# Patient Record
Sex: FEMALE | Race: WHITE | HISPANIC OR LATINO | Employment: UNEMPLOYED | URBAN - METROPOLITAN AREA
[De-identification: names, ages, dates, MRNs, and addresses within clinical notes are randomized per-mention and may not be internally consistent; named-entity substitution may affect disease eponyms.]

---

## 2022-07-19 ENCOUNTER — APPOINTMENT (EMERGENCY)
Dept: CT IMAGING | Facility: HOSPITAL | Age: 42
End: 2022-07-19

## 2022-07-19 ENCOUNTER — HOSPITAL ENCOUNTER (EMERGENCY)
Facility: HOSPITAL | Age: 42
Discharge: HOME | End: 2022-07-19
Attending: EMERGENCY MEDICINE

## 2022-07-19 VITALS
RESPIRATION RATE: 16 BRPM | OXYGEN SATURATION: 100 % | TEMPERATURE: 98.9 F | SYSTOLIC BLOOD PRESSURE: 119 MMHG | DIASTOLIC BLOOD PRESSURE: 81 MMHG | HEART RATE: 76 BPM

## 2022-07-19 DIAGNOSIS — R10.84 GENERALIZED ABDOMINAL PAIN: Primary | ICD-10-CM

## 2022-07-19 DIAGNOSIS — N39.0 UTI (URINARY TRACT INFECTION): ICD-10-CM

## 2022-07-19 LAB
ALBUMIN SERPL BCP-MCNC: 4.2 G/DL (ref 3.5–5)
ALP SERPL-CCNC: 82 U/L (ref 34–104)
ALT SERPL W P-5'-P-CCNC: 35 U/L (ref 7–52)
ANION GAP SERPL CALCULATED.3IONS-SCNC: 4 MMOL/L (ref 4–13)
AST SERPL W P-5'-P-CCNC: 23 U/L (ref 13–39)
B-HCG SERPL-ACNC: <1 MIU/ML (ref 0–11.6)
BACTERIA UR QL AUTO: ABNORMAL /HPF
BASOPHILS # BLD AUTO: 0.03 THOUSANDS/ΜL (ref 0–0.1)
BASOPHILS NFR BLD AUTO: 0 % (ref 0–1)
BILIRUB SERPL-MCNC: 0.93 MG/DL (ref 0.2–1)
BILIRUB UR QL STRIP: NEGATIVE
BUN SERPL-MCNC: 14 MG/DL (ref 5–25)
CALCIUM SERPL-MCNC: 9.4 MG/DL (ref 8.4–10.2)
CHLORIDE SERPL-SCNC: 106 MMOL/L (ref 96–108)
CLARITY UR: CLEAR
CO2 SERPL-SCNC: 29 MMOL/L (ref 21–32)
COLOR UR: ABNORMAL
CREAT SERPL-MCNC: 0.75 MG/DL (ref 0.6–1.3)
EOSINOPHIL # BLD AUTO: 0.48 THOUSAND/ΜL (ref 0–0.61)
EOSINOPHIL NFR BLD AUTO: 5 % (ref 0–6)
ERYTHROCYTE [DISTWIDTH] IN BLOOD BY AUTOMATED COUNT: 13.1 % (ref 11.6–15.1)
GFR SERPL CREATININE-BSD FRML MDRD: 98 ML/MIN/1.73SQ M
GLUCOSE SERPL-MCNC: 93 MG/DL (ref 65–140)
GLUCOSE UR STRIP-MCNC: NEGATIVE MG/DL
HCT VFR BLD AUTO: 44.7 % (ref 34.8–46.1)
HGB BLD-MCNC: 14.9 G/DL (ref 11.5–15.4)
HGB UR QL STRIP.AUTO: ABNORMAL
IMM GRANULOCYTES # BLD AUTO: 0.04 THOUSAND/UL (ref 0–0.2)
IMM GRANULOCYTES NFR BLD AUTO: 0 % (ref 0–2)
KETONES UR STRIP-MCNC: NEGATIVE MG/DL
LEUKOCYTE ESTERASE UR QL STRIP: ABNORMAL
LIPASE SERPL-CCNC: 36 U/L (ref 11–82)
LYMPHOCYTES # BLD AUTO: 3.27 THOUSANDS/ΜL (ref 0.6–4.47)
LYMPHOCYTES NFR BLD AUTO: 31 % (ref 14–44)
MCH RBC QN AUTO: 29.2 PG (ref 26.8–34.3)
MCHC RBC AUTO-ENTMCNC: 33.3 G/DL (ref 31.4–37.4)
MCV RBC AUTO: 88 FL (ref 82–98)
MONOCYTES # BLD AUTO: 0.69 THOUSAND/ΜL (ref 0.17–1.22)
MONOCYTES NFR BLD AUTO: 7 % (ref 4–12)
NEUTROPHILS # BLD AUTO: 6.08 THOUSANDS/ΜL (ref 1.85–7.62)
NEUTS SEG NFR BLD AUTO: 57 % (ref 43–75)
NITRITE UR QL STRIP: NEGATIVE
NON-SQ EPI CELLS URNS QL MICRO: ABNORMAL /HPF
NRBC BLD AUTO-RTO: 0 /100 WBCS
PH UR STRIP.AUTO: 6 [PH]
PLATELET # BLD AUTO: 261 THOUSANDS/UL (ref 149–390)
PMV BLD AUTO: 9.5 FL (ref 8.9–12.7)
POTASSIUM SERPL-SCNC: 3.6 MMOL/L (ref 3.5–5.3)
PROT SERPL-MCNC: 7.9 G/DL (ref 6.4–8.4)
PROT UR STRIP-MCNC: NEGATIVE MG/DL
RBC # BLD AUTO: 5.11 MILLION/UL (ref 3.81–5.12)
RBC #/AREA URNS AUTO: ABNORMAL /HPF
SODIUM SERPL-SCNC: 139 MMOL/L (ref 135–147)
SP GR UR STRIP.AUTO: 1.01 (ref 1–1.03)
UROBILINOGEN UR STRIP-ACNC: <2 MG/DL
WBC # BLD AUTO: 10.59 THOUSAND/UL (ref 4.31–10.16)
WBC #/AREA URNS AUTO: ABNORMAL /HPF

## 2022-07-19 PROCEDURE — 80053 COMPREHEN METABOLIC PANEL: CPT | Performed by: EMERGENCY MEDICINE

## 2022-07-19 PROCEDURE — 83690 ASSAY OF LIPASE: CPT | Performed by: EMERGENCY MEDICINE

## 2022-07-19 PROCEDURE — 84702 CHORIONIC GONADOTROPIN TEST: CPT | Performed by: EMERGENCY MEDICINE

## 2022-07-19 PROCEDURE — G1004 CDSM NDSC: HCPCS

## 2022-07-19 PROCEDURE — 99284 EMERGENCY DEPT VISIT MOD MDM: CPT

## 2022-07-19 PROCEDURE — 99284 EMERGENCY DEPT VISIT MOD MDM: CPT | Performed by: EMERGENCY MEDICINE

## 2022-07-19 PROCEDURE — 81001 URINALYSIS AUTO W/SCOPE: CPT | Performed by: EMERGENCY MEDICINE

## 2022-07-19 PROCEDURE — 74176 CT ABD & PELVIS W/O CONTRAST: CPT

## 2022-07-19 PROCEDURE — 85025 COMPLETE CBC W/AUTO DIFF WBC: CPT | Performed by: EMERGENCY MEDICINE

## 2022-07-19 PROCEDURE — 96374 THER/PROPH/DIAG INJ IV PUSH: CPT

## 2022-07-19 PROCEDURE — 76856 US EXAM PELVIC COMPLETE: CPT | Performed by: EMERGENCY MEDICINE

## 2022-07-19 PROCEDURE — 36415 COLL VENOUS BLD VENIPUNCTURE: CPT

## 2022-07-19 RX ORDER — CEFUROXIME AXETIL 500 MG/1
500 TABLET ORAL EVERY 12 HOURS SCHEDULED
Qty: 14 TABLET | Refills: 0 | Status: SHIPPED | OUTPATIENT
Start: 2022-07-19 | End: 2022-07-26

## 2022-07-19 RX ORDER — KETOROLAC TROMETHAMINE 30 MG/ML
15 INJECTION, SOLUTION INTRAMUSCULAR; INTRAVENOUS ONCE
Status: COMPLETED | OUTPATIENT
Start: 2022-07-19 | End: 2022-07-19

## 2022-07-19 RX ADMIN — KETOROLAC TROMETHAMINE 15 MG: 30 INJECTION, SOLUTION INTRAMUSCULAR at 17:56

## 2022-07-19 NOTE — ED PROVIDER NOTES
History  Chief Complaint   Patient presents with    Abdominal Pain     Pt to ED with c/o abdominal pain, n/v, pt reports last menstrual period 5/27/2022     This is a 14-year-old female without any significant related past medical history presenting to the ED today for lower abdominal pain  Patient states that her pain is in her bilateral lower quadrants, and is 4 to 5/10 intensity, worsened by any type movement, without any relation to food or urination or bowel movements  She states she has nausea occasionally, but currently none  She denies any other modifying factors for her pain  Patient denies any other associated symptoms  Her last menstrual period was in May, she is sexually active only with 1 man over the past 6 months  She has not had any vaginal bleeding, discharge dysuria frequency hesitancy or any other significant related symptoms  Patient is Panamanian only speaking, professional interpretation services utilized to complete history and exam       Abdominal Pain  Associated symptoms: no chest pain, no chills, no cough, no dysuria, no fever, no nausea, no shortness of breath and no vomiting        None       No past medical history on file  No past surgical history on file  No family history on file  I have reviewed and agree with the history as documented  E-Cigarette/Vaping     E-Cigarette/Vaping Substances          Review of Systems   Constitutional: Negative for activity change, chills and fever  HENT: Negative for congestion and rhinorrhea  Eyes: Negative for photophobia and visual disturbance  Respiratory: Negative for cough, chest tightness and shortness of breath  Cardiovascular: Negative for chest pain and leg swelling  Gastrointestinal: Positive for abdominal pain  Negative for abdominal distention, nausea and vomiting  Genitourinary: Negative for dysuria and frequency  Musculoskeletal: Negative for back pain and neck stiffness     Skin: Negative for rash and wound    Neurological: Negative for dizziness and weakness  Psychiatric/Behavioral: Negative for agitation and suicidal ideas  Physical Exam  Physical Exam  Vitals and nursing note reviewed  Constitutional:       General: She is not in acute distress  Appearance: Normal appearance  She is obese  She is not ill-appearing or toxic-appearing  HENT:      Head: Normocephalic and atraumatic  Right Ear: External ear normal       Left Ear: External ear normal       Nose: Nose normal  No congestion or rhinorrhea  Mouth/Throat:      Mouth: Mucous membranes are moist       Pharynx: Oropharynx is clear  No oropharyngeal exudate  Eyes:      General: No scleral icterus  Conjunctiva/sclera: Conjunctivae normal    Cardiovascular:      Rate and Rhythm: Normal rate and regular rhythm  Pulses: Normal pulses  Heart sounds: Normal heart sounds  No murmur heard  No gallop  Pulmonary:      Effort: Pulmonary effort is normal  No respiratory distress  Breath sounds: Normal breath sounds  No stridor  No wheezing or rhonchi  Abdominal:      General: Abdomen is flat  Bowel sounds are normal  There is no distension  Palpations: Abdomen is soft  There is no mass  Tenderness: There is abdominal tenderness in the right lower quadrant and left lower quadrant  There is no right CVA tenderness, left CVA tenderness, guarding or rebound  Musculoskeletal:         General: No swelling or tenderness  Normal range of motion  Cervical back: Normal range of motion and neck supple  No tenderness  Right lower leg: No edema  Left lower leg: No edema  Skin:     General: Skin is warm and dry  Capillary Refill: Capillary refill takes less than 2 seconds  Coloration: Skin is not jaundiced  Findings: No bruising  Neurological:      General: No focal deficit present  Mental Status: She is alert and oriented to person, place, and time  Mental status is at baseline  Cranial Nerves: No cranial nerve deficit  Sensory: No sensory deficit  Motor: No weakness  Psychiatric:         Mood and Affect: Mood normal  Mood is not anxious or depressed  Behavior: Behavior normal          Thought Content: Thought content normal          Judgment: Judgment normal          Vital Signs  ED Triage Vitals [07/19/22 1530]   Temperature Pulse Respirations Blood Pressure SpO2   98 9 °F (37 2 °C) 81 18 127/68 100 %      Temp Source Heart Rate Source Patient Position - Orthostatic VS BP Location FiO2 (%)   Oral Monitor Sitting Left arm --      Pain Score       --           Vitals:    07/19/22 1530   BP: 127/68   Pulse: 81   Patient Position - Orthostatic VS: Sitting         Visual Acuity      ED Medications  Medications - No data to display    Diagnostic Studies  Results Reviewed     Procedure Component Value Units Date/Time    CBC and differential [[id number]]  (Abnormal) Collected: 07/19/22 1537    Lab Status: Final result Specimen: Blood from Arm, Right Updated: 07/19/22 1551     WBC 10 59 Thousand/uL      RBC 5 11 Million/uL      Hemoglobin 14 9 g/dL      Hematocrit 44 7 %      MCV 88 fL      MCH 29 2 pg      MCHC 33 3 g/dL      RDW 13 1 %      MPV 9 5 fL      Platelets 423 Thousands/uL      nRBC 0 /100 WBCs      Neutrophils Relative 57 %      Immat GRANS % 0 %      Lymphocytes Relative 31 %      Monocytes Relative 7 %      Eosinophils Relative 5 %      Basophils Relative 0 %      Neutrophils Absolute 6 08 Thousands/µL      Immature Grans Absolute 0 04 Thousand/uL      Lymphocytes Absolute 3 27 Thousands/µL      Monocytes Absolute 0 69 Thousand/µL      Eosinophils Absolute 0 48 Thousand/µL      Basophils Absolute 0 03 Thousands/µL     Comprehensive metabolic panel [[id number]] Collected: 07/19/22 1537    Lab Status: In process Specimen: Blood from Arm, Right Updated: 07/19/22 1541    Lipase [[id number]] Collected: 07/19/22 1537    Lab Status:  In process Specimen: Blood from Arm, Right Updated: 07/19/22 1541    hCG, quantitative, pregnancy [[id number]] Collected: 07/19/22 1537    Lab Status: In process Specimen: Blood from Arm, Right Updated: 07/19/22 1541                 No orders to display              Procedures  Procedures         ED Course                                             MDM  Number of Diagnoses or Management Options  Generalized abdominal pain  UTI (urinary tract infection)  Diagnosis management comments: This Is a 35-year-old female presented to the ED for bilateral lower quadrant pain  Patient states that her symptoms have been present for most of today, and extend to yesterday  She denies any vaginal symptoms, urinary symptoms, and only occasionally has nausea, and is currently not nauseous on presentation  She has a last menstrual period in May, and otherwise does not have any significant complaints  Her physical exam is remarkable for bilateral lower quadrant tenderness, but otherwise unremarkable  Her differential diagnosis includes:  Intrauterine pregnancy versus heterotopic pregnancy versus dysmenorrhea versus ovarian cyst versus other  Patient has a CBC showing a marginal leukocytosis, her lipase is negative her urinalysis shows small leukocyte Estrace, with a small amount of blood  Her metabolic panel also is unremarkable  She has a quantitative pregnancy test which was negative  She had a pelvic bedside ultrasound showing no evidence for IUP, no free fluid in the pelvis, but she did have some debris in her uterus  Her CT of the abdomen pelvis shows no evidence for acute abdominal/pelvic abnormality  Patient was thought to likely have a urinary tract infection causing her symptoms, and her symptoms significantly improved with Toradol  Patient was given strict return precautions with which she agreed to comply  She was discharged in stable condition with antibiotics and felt safe going home        Disposition  Final diagnoses:   None     ED Disposition     None      Follow-up Information    None         Patient's Medications    No medications on file       No discharge procedures on file      PDMP Review     None          ED Provider  Electronically Signed by           Roxana Lang MD  07/20/22 4933

## 2022-07-19 NOTE — DISCHARGE INSTRUCTIONS
You were seen in the ED for abdominal pain  You had bloodwork and a CT scan showing no significant abnormalities  You had urine suggestive of a urinary tract infection  Please take ceftin twice daily for the next 7 days  Thank you very much for utilizing the ED this evening

## 2022-07-20 NOTE — ED PROCEDURE NOTE
PROCEDURE  POC Pelvic US    Date/Time: 7/20/2022 2:40 PM  Performed by: Raul Mcmillan MD  Authorized by: Raul Mcmillan MD     Patient location:  ED  Procedure details:     Exam Type:  Diagnostic    Indications: non-OB abdominal pain and non-OB pelvic pain      Assessment for: evaluate for ovarian cyst and free pelvic fluid      Technique:  Transabdominal GYN (HCG-) exam    Image quality: diagnostic      Image availability:  Still images obtained, images available in PACS and video obtained  Uterine findings:     Endometrial stripe: identified      Intrauterine pregnancy: not identified    Right adnexa findings:     Right ovary:  Visualized    Right ovarian cyst: not identified    Left adnexa findings:     Left ovary:  Visualized    Left ovarian cyst: not identified    Other findings:     Free pelvic fluid: not identified      Free peritoneal fluid: not identified    Interpretation:     Ultrasound impressions: normal    Comments:      Debris in the uterus, likely due to imminent menstruation           Raul Mcmillan MD  07/20/22 2038

## 2022-12-23 ENCOUNTER — APPOINTMENT (EMERGENCY)
Dept: ULTRASOUND IMAGING | Facility: HOSPITAL | Age: 42
End: 2022-12-23

## 2022-12-23 ENCOUNTER — HOSPITAL ENCOUNTER (EMERGENCY)
Facility: HOSPITAL | Age: 42
Discharge: HOME | End: 2022-12-23
Attending: EMERGENCY MEDICINE

## 2022-12-23 VITALS
RESPIRATION RATE: 16 BRPM | OXYGEN SATURATION: 100 % | DIASTOLIC BLOOD PRESSURE: 63 MMHG | HEART RATE: 81 BPM | TEMPERATURE: 98.3 F | SYSTOLIC BLOOD PRESSURE: 140 MMHG | WEIGHT: 161 LBS

## 2022-12-23 DIAGNOSIS — O20.0 THREATENED MISCARRIAGE: Primary | ICD-10-CM

## 2022-12-23 LAB
ABO GROUP BLD: NORMAL
ALBUMIN SERPL BCP-MCNC: 3.9 G/DL (ref 3.5–5)
ALP SERPL-CCNC: 77 U/L (ref 34–104)
ALT SERPL W P-5'-P-CCNC: 18 U/L (ref 7–52)
ANION GAP SERPL CALCULATED.3IONS-SCNC: 7 MMOL/L (ref 4–13)
AST SERPL W P-5'-P-CCNC: 16 U/L (ref 13–39)
B-HCG SERPL-ACNC: ABNORMAL MIU/ML (ref 0–11.6)
BASOPHILS # BLD AUTO: 0.05 THOUSANDS/ÂΜL (ref 0–0.1)
BASOPHILS NFR BLD AUTO: 1 % (ref 0–1)
BILIRUB SERPL-MCNC: 0.68 MG/DL (ref 0.2–1)
BUN SERPL-MCNC: 16 MG/DL (ref 5–25)
CALCIUM SERPL-MCNC: 8.8 MG/DL (ref 8.4–10.2)
CHLORIDE SERPL-SCNC: 107 MMOL/L (ref 96–108)
CO2 SERPL-SCNC: 23 MMOL/L (ref 21–32)
CREAT SERPL-MCNC: 0.67 MG/DL (ref 0.6–1.3)
EOSINOPHIL # BLD AUTO: 0.27 THOUSAND/ÂΜL (ref 0–0.61)
EOSINOPHIL NFR BLD AUTO: 3 % (ref 0–6)
ERYTHROCYTE [DISTWIDTH] IN BLOOD BY AUTOMATED COUNT: 13.7 % (ref 11.6–15.1)
GFR SERPL CREATININE-BSD FRML MDRD: 108 ML/MIN/1.73SQ M
GLUCOSE SERPL-MCNC: 104 MG/DL (ref 65–140)
HCT VFR BLD AUTO: 42.2 % (ref 34.8–46.1)
HGB BLD-MCNC: 14 G/DL (ref 11.5–15.4)
IMM GRANULOCYTES # BLD AUTO: 0.04 THOUSAND/UL (ref 0–0.2)
IMM GRANULOCYTES NFR BLD AUTO: 0 % (ref 0–2)
LYMPHOCYTES # BLD AUTO: 3.3 THOUSANDS/ÂΜL (ref 0.6–4.47)
LYMPHOCYTES NFR BLD AUTO: 33 % (ref 14–44)
MCH RBC QN AUTO: 28.8 PG (ref 26.8–34.3)
MCHC RBC AUTO-ENTMCNC: 33.2 G/DL (ref 31.4–37.4)
MCV RBC AUTO: 87 FL (ref 82–98)
MONOCYTES # BLD AUTO: 0.67 THOUSAND/ÂΜL (ref 0.17–1.22)
MONOCYTES NFR BLD AUTO: 7 % (ref 4–12)
NEUTROPHILS # BLD AUTO: 5.83 THOUSANDS/ÂΜL (ref 1.85–7.62)
NEUTS SEG NFR BLD AUTO: 56 % (ref 43–75)
NRBC BLD AUTO-RTO: 0 /100 WBCS
PLATELET # BLD AUTO: 265 THOUSANDS/UL (ref 149–390)
PMV BLD AUTO: 8.8 FL (ref 8.9–12.7)
POTASSIUM SERPL-SCNC: 3.4 MMOL/L (ref 3.5–5.3)
PROT SERPL-MCNC: 7.2 G/DL (ref 6.4–8.4)
RBC # BLD AUTO: 4.86 MILLION/UL (ref 3.81–5.12)
RH BLD: POSITIVE
SODIUM SERPL-SCNC: 137 MMOL/L (ref 135–147)
WBC # BLD AUTO: 10.16 THOUSAND/UL (ref 4.31–10.16)

## 2022-12-23 NOTE — ED ATTENDING ATTESTATION
12/23/2022  IAmish DO, saw and evaluated the patient  I have discussed the patient with the resident/non-physician practitioner and agree with the resident's/non-physician practitioner's findings, Plan of Care, and MDM as documented in the resident's/non-physician practitioner's note, except where noted  All available labs and Radiology studies were reviewed  I was present for key portions of any procedure(s) performed by the resident/non-physician practitioner and I was immediately available to provide assistance  At this point I agree with the current assessment done in the Emergency Department  I have conducted an independent evaluation of this patient a history and physical is as follows:    43-year-old female coming into the ED for evaluation of vaginal bleeding, spotting which is progressed to bright red blood per vagina today  She states she believes she is about 8 weeks pregnant by her last menstrual period  She is G6, P5  She complains of lower abdominal discomfort and cramping  On physical exam: pt is well appearing, in NAD  mucous membranes moist   CTA b/l , heart RRR  Abdomen NT, ND, no R/R/G  Neuro intact, gcs 15  Cap refill < 2 sec, skin warm and dry  Pelvic ultrasound shows approximately 6 weeks gestation pregnancy  No fetal cardiac to be seen  Cervix closed  Diagnosis threatened miscarriage, repeat ultrasound in 1 to 2 weeks, case discussed with OB/GYN who agreed with the above  No indication for rhogam, RH +   Labs [facility]    ED Course         Critical Care Time  Procedures

## 2022-12-23 NOTE — ED PROVIDER NOTES
History  Chief Complaint   Patient presents with   • Vaginal Bleeding - Pregnant     Pt states she is 8 weeks pregnant  Started with brown vaginal discharge yesterday  States today discharge turned into bright red blood  57-year-old  female presents emergency department stating she is currently 8 weeks via home test pregnant with a chief complaint of left lower abdominal quadrant pain, and vaginal bleeding  Patient states that she initially developed pain yesterday ranked 2 out of 10, cramping in nature with associated brown vaginal discharge  Patient states that she has developed small red spotting episodes that began earlier this morning which prompted her to come to the emergency department for evaluation  Patient denies any known fevers, headache, chest pain, shortness of breath, nausea, vomiting, diarrhea, constipation, or any other concerns at this time  Vaginal Bleeding  Quality:  Spotting and bright red  Severity:  Mild  Onset quality:  Sudden  Duration:  1 day  Timing:  Constant  Progression:  Unchanged  Chronicity:  New  Menstrual history:  Regular  Possible pregnancy: yes    Relieved by:  Nothing  Worsened by:  Nothing  Ineffective treatments:  None tried  Associated symptoms: abdominal pain and vaginal discharge Kim Trevizo    Associated symptoms: no back pain, no dysuria, no fever and no nausea        None       History reviewed  No pertinent past medical history  History reviewed  No pertinent surgical history  History reviewed  No pertinent family history  I have reviewed and agree with the history as documented  E-Cigarette/Vaping     E-Cigarette/Vaping Substances     Tobacco Use   • Smokeless tobacco: Never        Review of Systems   Constitutional: Negative for chills and fever  HENT: Negative for ear pain and sore throat  Eyes: Negative for pain and visual disturbance  Respiratory: Negative for cough and shortness of breath      Cardiovascular: Negative for chest pain and palpitations  Gastrointestinal: Positive for abdominal pain  Negative for nausea and vomiting  Genitourinary: Positive for vaginal bleeding and vaginal discharge Antonio Pereira)  Negative for dysuria and hematuria  Musculoskeletal: Negative for arthralgias and back pain  Skin: Negative for color change and rash  Neurological: Negative for seizures and syncope  All other systems reviewed and are negative  Physical Exam  ED Triage Vitals [12/23/22 1704]   Temperature Pulse Respirations Blood Pressure SpO2   98 3 °F (36 8 °C) 81 16 140/63 100 %      Temp Source Heart Rate Source Patient Position - Orthostatic VS BP Location FiO2 (%)   Oral Monitor Sitting Right arm --      Pain Score       --             Orthostatic Vital Signs  Vitals:    12/23/22 1704   BP: 140/63   Pulse: 81   Patient Position - Orthostatic VS: Sitting       Physical Exam  Vitals and nursing note reviewed  Constitutional:       General: She is not in acute distress  Appearance: She is well-developed  HENT:      Head: Normocephalic and atraumatic  Right Ear: External ear normal       Left Ear: External ear normal       Nose: Nose normal       Mouth/Throat:      Mouth: Mucous membranes are moist    Eyes:      Conjunctiva/sclera: Conjunctivae normal    Cardiovascular:      Rate and Rhythm: Normal rate and regular rhythm  Heart sounds: No murmur heard  Pulmonary:      Effort: Pulmonary effort is normal  No respiratory distress  Breath sounds: Normal breath sounds  Abdominal:      Palpations: Abdomen is soft  Tenderness: There is no abdominal tenderness  There is no guarding or rebound  Musculoskeletal:         General: No swelling  Normal range of motion  Cervical back: Normal range of motion  Skin:     General: Skin is warm and dry  Capillary Refill: Capillary refill takes less than 2 seconds  Neurological:      Mental Status: She is alert  Mental status is at baseline     Psychiatric: Mood and Affect: Mood normal          ED Medications  Medications - No data to display    Diagnostic Studies  Results Reviewed     Procedure Component Value Units Date/Time    Pregnancy, hCG, quantitative [[id number]]  (Abnormal) Collected: 12/23/22 1743    Lab Status: Final result Specimen: Blood from Arm, Right Updated: 12/23/22 1841     HCG, Quant 13,472 mIU/mL     Narrative:       Expected Ranges:     Approximate               Approximate HCG  Gestation age          Concentration ( mIU/mL)  _____________          ______________________   Celesta Ling                      HCG values  0 2-1                       5-50  1-2                           2-3                         100-5000  3-4                         500-50203  4-5                         1000-85076  5-6                         [id number]  6-8                         [id number]  8-12                        03098-812007      Comprehensive metabolic panel [[id number]]  (Abnormal) Collected: 12/23/22 1743    Lab Status: Final result Specimen: Blood from Arm, Right Updated: 12/23/22 1808     Sodium 137 mmol/L      Potassium 3 4 mmol/L      Chloride 107 mmol/L      CO2 23 mmol/L      ANION GAP 7 mmol/L      BUN 16 mg/dL      Creatinine 0 67 mg/dL      Glucose 104 mg/dL      Calcium 8 8 mg/dL      AST 16 U/L      ALT 18 U/L      Alkaline Phosphatase 77 U/L      Total Protein 7 2 g/dL      Albumin 3 9 g/dL      Total Bilirubin 0 68 mg/dL      eGFR 108 ml/min/1 73sq m     Narrative:      [facility] guidelines for Chronic Kidney Disease (CKD):   •  Stage 1 with normal or high GFR (GFR > 90 mL/min/1 73 square meters)  •  Stage 2 Mild CKD (GFR = 60-89 mL/min/1 73 square meters)  •  Stage 3A Moderate CKD (GFR = 45-59 mL/min/1 73 square meters)  •  Stage 3B Moderate CKD (GFR = 30-44 mL/min/1 73 square meters)  •  Stage 4 Severe CKD (GFR = 15-29 mL/min/1 73 square meters)  •  Stage 5 End Stage CKD (GFR <15 mL/min/1 73 square meters)  Note: GFR calculation is accurate only with a steady state creatinine    CBC and differential [[id number]]  (Abnormal) Collected: 22    Lab Status: Final result Specimen: Blood from Arm, Right Updated: 22     WBC 10 16 Thousand/uL      RBC 4 86 Million/uL      Hemoglobin 14 0 g/dL      Hematocrit 42 2 %      MCV 87 fL      MCH 28 8 pg      MCHC 33 2 g/dL      RDW 13 7 %      MPV 8 8 fL      Platelets 856 Thousands/uL      nRBC 0 /100 WBCs      Neutrophils Relative 56 %      Immat GRANS % 0 %      Lymphocytes Relative 33 %      Monocytes Relative 7 %      Eosinophils Relative 3 %      Basophils Relative 1 %      Neutrophils Absolute 5 83 Thousands/µL      Immature Grans Absolute 0 04 Thousand/uL      Lymphocytes Absolute 3 30 Thousands/µL      Monocytes Absolute 0 67 Thousand/µL      Eosinophils Absolute 0 27 Thousand/µL      Basophils Absolute 0 05 Thousands/µL     POCT pregnancy, urine [[id number]]     Lab Status: No result Specimen: Urine     UA w Reflex to Microscopic w Reflex to Culture [[id number]]     Lab Status: No result Specimen: Urine                  US OB < 14 weeks with transvaginal   Final Result by Maikol Rizzo MD ([id number])      Single intrauterine gestation at 6 weeks 1 days (range +/- 3)  Irregular shaped gestational sac with small subchorionic hemorrhage  Blood identified within the endometrial canal       Absent cardiac activity could be due to early gestational age  Short-term follow-up with serial beta-hCG and pelvic/OB ultrasound recommended in 2 weeks  The study was marked in EPIC for significant notification        Workstation performed: [id number]               Procedures  Procedures      ED Course                                       MDM  Number of Diagnoses or Management Options  Threatened miscarriage  Diagnosis management comments: 51-year-old  female presents emergency department stating she is currently 8 weeks via home test pregnant with a chief complaint of left lower abdominal quadrant pain, and vaginal bleeding  Patient seen and examined with benign exam, no abdominal tenderness, guarding, or rebound  Due to patient's history and presentation CBC, CMP, hCG/ABO/Rh status was obtained  Laboratory analysis reported hCG quant of 13,472  Ultrasound was also obtained which reported a single uterine gestation at 6 weeks and 1 day with irregular shaped gestational sac with a subchorionic hemorrhage and blood identified in the endometrial cavity  There was no fetal cardiac activity appreciated  Decision was made to discussed patient's case with obstetrics and gynecology who recommended patient have follow-up ultrasound in 1 week  Discussed patient's results with the patient who expressed understanding  Patient related that they had no family doctor, and information was provided  Patient was also given obstetrics follow-up  Patient appears well, is nontoxic appearing, expresses understanding and agrees with plan of care at this time  In light of this patient would benefit from outpatient management  Amount and/or Complexity of Data Reviewed  Clinical lab tests: ordered and reviewed  Tests in the radiology section of CPT®: ordered and reviewed  Discuss the patient with other providers: yes        Disposition  Final diagnoses:   Threatened miscarriage     Time reflects when diagnosis was documented in both MDM as applicable and the Disposition within this note     Time User Action Codes Description Comment    12/23/2022  7:18 PM [facility] [facility] [O20 [id number]] Threatened miscarriage       ED Disposition     ED Disposition   Discharge    Condition   Stable    Date/Time   Fri Dec 23, 2022  7:18 PM    Comment   Luz Leyva discharge to home/self care                 Follow-up Information     Follow up With Specialties Details Why Contact Info Additional [address]   [facility] [id number]  [address] , [place], [place], [address] OB/GYN [facility]   [facility] [address] [facility] [address] [phone] [facility] SPECIALTY [facility] OB/GYN [address] [facility], [facility] [address], [place], [place], [address],           Patient's Medications    No medications on file     No discharge procedures on file  PDMP Review     None           ED Provider  Attending physically available and evaluated Luz Geigershakira DALE managed the patient along with the ED Attending      Electronically Signed by         lEena Thomson MD  12/23/22 0149

## 2022-12-23 NOTE — Clinical Note
Elantahminagenevieve Alcocer was seen and treated in our emergency department on 12/23/2022  Diagnosis:     Charles Diaz  may return to work on return date  She may return on this date: Once cleared by Obstetrics      If you have any questions or concerns, please don't hesitate to call        Nicole Alvarez MD    ______________________________           _______________          _______________  Hospital Representative                              Date                                Time

## 2022-12-25 ENCOUNTER — HOSPITAL ENCOUNTER (EMERGENCY)
Facility: HOSPITAL | Age: 42
Discharge: HOME | End: 2022-12-25
Attending: EMERGENCY MEDICINE

## 2022-12-25 VITALS
RESPIRATION RATE: 16 BRPM | TEMPERATURE: 98.8 F | OXYGEN SATURATION: 99 % | DIASTOLIC BLOOD PRESSURE: 79 MMHG | HEART RATE: 103 BPM | SYSTOLIC BLOOD PRESSURE: 120 MMHG

## 2022-12-25 DIAGNOSIS — O46.90 VAGINAL BLEEDING DURING PREGNANCY: ICD-10-CM

## 2022-12-25 DIAGNOSIS — O03.4 INEVITABLE ABORTION: Primary | ICD-10-CM

## 2022-12-25 DIAGNOSIS — Z3A.01 LESS THAN 8 WEEKS GESTATION OF PREGNANCY: ICD-10-CM

## 2022-12-25 LAB
B-HCG SERPL-ACNC: 6004 MIU/ML
BACTERIA UR QL AUTO: ABNORMAL /HPF
BASOPHILS # BLD AUTO: 0.04 THOUSANDS/ÂΜL (ref 0–0.1)
BASOPHILS NFR BLD AUTO: 0 % (ref 0–1)
BILIRUB UR QL STRIP: NEGATIVE
CLARITY UR: ABNORMAL
COLOR UR: YELLOW
EOSINOPHIL # BLD AUTO: 0.24 THOUSAND/ÂΜL (ref 0–0.61)
EOSINOPHIL NFR BLD AUTO: 2 % (ref 0–6)
ERYTHROCYTE [DISTWIDTH] IN BLOOD BY AUTOMATED COUNT: 13.4 % (ref 11.6–15.1)
GLUCOSE UR STRIP-MCNC: NEGATIVE MG/DL
HCT VFR BLD AUTO: 41.6 % (ref 34.8–46.1)
HGB BLD-MCNC: 13.9 G/DL (ref 11.5–15.4)
HGB UR QL STRIP.AUTO: ABNORMAL
IMM GRANULOCYTES # BLD AUTO: 0.03 THOUSAND/UL (ref 0–0.2)
IMM GRANULOCYTES NFR BLD AUTO: 0 % (ref 0–2)
KETONES UR STRIP-MCNC: NEGATIVE MG/DL
LEUKOCYTE ESTERASE UR QL STRIP: ABNORMAL
LYMPHOCYTES # BLD AUTO: 2.34 THOUSANDS/ÂΜL (ref 0.6–4.47)
LYMPHOCYTES NFR BLD AUTO: 19 % (ref 14–44)
MCH RBC QN AUTO: 29.2 PG (ref 26.8–34.3)
MCHC RBC AUTO-ENTMCNC: 33.4 G/DL (ref 31.4–37.4)
MCV RBC AUTO: 87 FL (ref 82–98)
MONOCYTES # BLD AUTO: 0.81 THOUSAND/ÂΜL (ref 0.17–1.22)
MONOCYTES NFR BLD AUTO: 7 % (ref 4–12)
NEUTROPHILS # BLD AUTO: 8.59 THOUSANDS/ÂΜL (ref 1.85–7.62)
NEUTS SEG NFR BLD AUTO: 72 % (ref 43–75)
NITRITE UR QL STRIP: NEGATIVE
NON-SQ EPI CELLS URNS QL MICRO: ABNORMAL /HPF
NRBC BLD AUTO-RTO: 0 /100 WBCS
PH UR STRIP.AUTO: 7 [PH]
PLATELET # BLD AUTO: 244 THOUSANDS/UL (ref 149–390)
PMV BLD AUTO: 8.8 FL (ref 8.9–12.7)
PROT UR STRIP-MCNC: NEGATIVE MG/DL
RBC # BLD AUTO: 4.76 MILLION/UL (ref 3.81–5.12)
RBC #/AREA URNS AUTO: ABNORMAL /HPF
SP GR UR STRIP.AUTO: 1.02 (ref 1–1.03)
UROBILINOGEN UR STRIP-ACNC: <2 MG/DL
WBC # BLD AUTO: 12.05 THOUSAND/UL (ref 4.31–10.16)
WBC #/AREA URNS AUTO: ABNORMAL /HPF

## 2022-12-25 NOTE — ED PROVIDER NOTES
History  Chief Complaint   Patient presents with   • Vaginal Bleeding - Pregnant     Pt is 6 months pregnant and having vaginal bleeding for 2 days  Also having dizziness, pelvic/lower abdominal pain  Denies N/V     Patient is -year-old female presenting for vaginal bleeding and abdominal pain  Last menstrual period was   Patient recently presented to [address] [place] [address] for similar complaint  At that time patient received ultrasound and blood work  Ultrasound revealed subchorionic hemorrhage but was unable to detect fetal cardiac activity, unclear if this is an  or due to early fetal age  Fetal age was estimated to be approximately 6 weeks and 1 day via ultrasound  (Patient states she is approximately 8 weeks pregnant)  Patient states that at the ER she was instructed to return if symptoms/bleeding continued  Patient denies any chest pain, shortness of breath, lightheadedness, dizziness, increase in severity of abdominal pain  Patient has a picture that shows several small to medium size clots  On presentation patient is tachycardic however rest of vital signs are within normal limits  As patient was primarily Serbian-speaking, interpretation services were used to obtain patient history  None       No past medical history on file  No past surgical history on file  No family history on file  I have reviewed and agree with the history as documented  E-Cigarette/Vaping     E-Cigarette/Vaping Substances     Tobacco Use   • Smokeless tobacco: Never        Review of Systems   Constitutional: Negative  HENT: Negative  Eyes: Negative  Respiratory: Negative  Cardiovascular: Negative  Gastrointestinal: Positive for abdominal pain  Endocrine: Negative  Genitourinary: Positive for vaginal bleeding  Musculoskeletal: Negative  Skin: Negative  Allergic/Immunologic: Negative  Neurological: Negative  Hematological: Negative  Psychiatric/Behavioral: Negative  Physical Exam  ED Triage Vitals [12/25/22 1556]   Temperature Pulse Respirations Blood Pressure SpO2   98 8 °F (37 1 °C) 103 16 120/79 99 %      Temp Source Heart Rate Source Patient Position - Orthostatic VS BP Location FiO2 (%)   Oral Monitor -- Left arm --      Pain Score       8             Orthostatic Vital Signs  Vitals:    12/25/22 1556   BP: 120/79   Pulse: 103       Physical Exam  Vitals and nursing note reviewed  Constitutional:       General: She is not in acute distress  Appearance: Normal appearance  She is not ill-appearing  HENT:      Head: Normocephalic and atraumatic  Right Ear: Tympanic membrane, ear canal and external ear normal       Left Ear: Tympanic membrane, ear canal and external ear normal       Nose: Nose normal       Mouth/Throat:      Mouth: Mucous membranes are moist       Pharynx: Oropharynx is clear  Eyes:      Extraocular Movements: Extraocular movements intact  Conjunctiva/sclera: Conjunctivae normal       Pupils: Pupils are equal, round, and reactive to light  Cardiovascular:      Rate and Rhythm: Regular rhythm  Tachycardia present  Pulses: Normal pulses  Heart sounds: Normal heart sounds  Pulmonary:      Effort: Pulmonary effort is normal       Breath sounds: Normal breath sounds  Abdominal:      General: Abdomen is flat  Bowel sounds are normal       Palpations: Abdomen is soft  Tenderness: There is abdominal tenderness  Comments: Tenderness to palpation in lower quadrants/suprapubic  No tenderness palpation in right upper and left upper quadrants  Musculoskeletal:         General: Normal range of motion  Cervical back: Normal range of motion and neck supple  Skin:     General: Skin is warm and dry  Capillary Refill: Capillary refill takes less than 2 seconds  Neurological:      General: No focal deficit present        Mental Status: She is alert and oriented to person, place, and time  Psychiatric:         Mood and Affect: Mood normal          Behavior: Behavior normal          Thought Content: Thought content normal          Judgment: Judgment normal          ED Medications  Medications - No data to display    Diagnostic Studies  Results Reviewed     Procedure Component Value Units Date/Time    Urine Microscopic [[id number]]  (Abnormal) Collected: 12/25/22 1929    Lab Status: Final result Specimen: Urine, Clean Catch Updated: 12/25/22 1947     RBC, UA Innumerable /hpf      WBC, UA 10-20 /hpf      Epithelial Cells Occasional /hpf      Bacteria, UA None Seen /hpf     Urine culture [[id number]] Collected: 12/25/22 1929    Lab Status:  In process Specimen: Urine, Clean Catch Updated: 12/25/22 1947    UA w Reflex to Microscopic w Reflex to Culture [[id number]]  (Abnormal) Collected: 12/25/22 1929    Lab Status: Final result Specimen: Urine, Clean Catch Updated: 12/25/22 1935     Color, UA Yellow     Clarity, UA Turbid     Specific [place], UA 1 016     pH, UA 7 0     Leukocytes, UA Small     Nitrite, UA Negative     Protein, UA Negative mg/dl      Glucose, UA Negative mg/dl      Ketones, UA Negative mg/dl      Urobilinogen, UA <2 0 mg/dl      Bilirubin, UA Negative     Occult Blood, UA Large    Quantitative hCG [[id number]]  (Abnormal) Collected: 12/25/22 1706    Lab Status: Final result Specimen: Blood from Arm, Left Updated: 12/25/22 1745     HCG, Quant 6,004 mIU/mL     Narrative:       Expected Ranges:     Approximate               Approximate HCG  Gestation age          Concentration ( mIU/mL)  _____________          ______________________   Maria Eugenia Naranjo                      HCG values  0 2-1                       5-50  1-2                           2-3                         100-5000  3-4                         500-93916  4-5                         1000-57560  5-6                         [id number]  6-8                         64152-779116  8-12 50092-412740      CBC and differential [[id number]]  (Abnormal) Collected: 22 1706    Lab Status: Final result Specimen: Blood from Arm, Left Updated: 22     WBC 12 05 Thousand/uL      RBC 4 76 Million/uL      Hemoglobin 13 9 g/dL      Hematocrit 41 6 %      MCV 87 fL      MCH 29 2 pg      MCHC 33 4 g/dL      RDW 13 4 %      MPV 8 8 fL      Platelets 760 Thousands/uL      nRBC 0 /100 WBCs      Neutrophils Relative 72 %      Immat GRANS % 0 %      Lymphocytes Relative 19 %      Monocytes Relative 7 %      Eosinophils Relative 2 %      Basophils Relative 0 %      Neutrophils Absolute 8 59 Thousands/µL      Immature Grans Absolute 0 03 Thousand/uL      Lymphocytes Absolute 2 34 Thousands/µL      Monocytes Absolute 0 81 Thousand/µL      Eosinophils Absolute 0 24 Thousand/µL      Basophils Absolute 0 04 Thousands/µL                  No orders to display         Procedures  Procedures      ED Course  ED Course as of 22   Sun Dec 25, 2022   1747 HCG QUANTITATIVE(!): 6,004   1747 This is down significantly from [facility] collected on Friday at [address] [place] [address]  Number of Diagnoses or Management Options  Inevitable : new and requires workup  Less than 8 weeks gestation of pregnancy: new and requires workup  Vaginal bleeding during pregnancy: new and requires workup  Diagnosis management comments: Patient is 66-year-old female presenting for vaginal bleeding and abdominal pain  Ddx: Subchorionic hemorrhage, spontaneous , ectopic pregnancy, intrauterine pregnancy, uti  Based on patient presentation and previous ultrasound findings and lab work, primary concerns for continued subchorionic hemorrhage versus continuous   Intrauterine pregnancy was confirmed previously at [address] [place] [address]    However lack of fetal cardiac activity is questionable for spontaneous  or simply could have been too early to detect via ultrasound  We will plan to repeat CBC and quant beta-hCG to determine if levels are going up or down  Patient is B+, confirmed by blood work done at [address] [place] [address], no need for RhoGAM at this time  If levels are going up this would be reassuring for continued intrauterine pregnancy, however if levels are declining and this would be concerning for spontaneous   Bedside transabdominal ultrasound performed in the emergency department however unable to identify fetal cardiac activity  Pending blood work, will determine whether repeat transvaginal ultrasound needed  Plan for follow-up with OB/GYN regardless to establish care   -Lab work significant for decreased beta-hCG from prior taking on Friday  This most likely indicates inevitable   Findings discussed with patient  Patient understands, plan for follow-up with OB/GYN in the next few days  Patient would like urine testing to check for infection, UA sent for analysis  Plan for discharge with or without antibiotics pending results of UA as well as OB/GYN follow-up  -UA negative for UTI  Plan for discharge with recommendations for follow-up with OB/GYN  Ambulatory OB/GYN order placed  Patient ready for discharge         Amount and/or Complexity of Data Reviewed  Clinical lab tests: ordered and reviewed  Tests in the radiology section of CPT®: ordered and reviewed  Tests in the medicine section of CPT®: ordered and reviewed  Review and summarize past medical records: yes  Independent visualization of images, tracings, or specimens: yes    Risk of Complications, Morbidity, and/or Mortality  Presenting problems: low  Diagnostic procedures: low  Management options: low        Disposition  Final diagnoses:   Less than 8 weeks gestation of pregnancy   Vaginal bleeding during pregnancy   Inevitable      Time reflects when diagnosis was documented in both MDM as applicable and the Disposition within this note     Time User Action Codes Description Comment    2022  5:37 PM Jeaneen Norse Add [N93 9] Vaginal bleeding     2022  5:37 PM Jeaneen Norse Add [P6F 47] Less than 8 weeks gestation of pregnancy     2022  5:37 PM Jeaneen Norse Modify [J8U 05] Less than 8 weeks gestation of pregnancy     2022  5:37 PM Jeaneen Norse Remove [N93 9] Vaginal bleeding     2022  5:37 PM Sofy Polka [O96 54] Vaginal bleeding during pregnancy     2022  5:37 PM Jeaneen Norse Modify [A0P 59] Less than 8 weeks gestation of pregnancy     2022  5:37 PM Jeaneen Norse Modify [R17 33] Vaginal bleeding during pregnancy     2022  6:13 PM Jeaneen Norse Add [O03 4] Inevitable      2022  6:13 PM Jeaneen Norse Modify [F95 49] Vaginal bleeding during pregnancy     2022  6:13 PM Jeaneen Norse Modify [O03 4] Inevitable        ED Disposition     ED Disposition   Discharge    Condition   Stable    Date/Time   Sun Dec 25, 2022  5:37 PM    Comment   Sylvie Dunlap discharge to home/self care  Follow-up Information     Follow up With Specialties Details Why [facility]   [address], [address] [phone]          There are no discharge medications for this patient  PDMP Review     None           ED Provider  Attending physically available and evaluated Sylvie Dunlap  CHITO managed the patient along with the ED Attending      Electronically Signed by         Alfonso Collins MD  22

## 2022-12-25 NOTE — ED ATTENDING ATTESTATION
2022  I, Zackary Correa MD, saw and evaluated the patient  I have discussed the patient with the resident/non-physician practitioner and agree with the resident's/non-physician practitioner's findings, Plan of Care, and MDM as documented in the resident's/non-physician practitioner's note, except where noted  All available labs and Radiology studies were reviewed  I was present for key portions of any procedure(s) performed by the resident/non-physician practitioner and I was immediately available to provide assistance  At this point I agree with the current assessment done in the Emergency Department  I have conducted an independent evaluation of this patient a history and physical is as follows:    ED Course         Critical Care Time  Procedures    42 yo female , 8 weeks pregnant here for three days of vaginal bleeding, seen at [facility] few days ago and had labs and u/s showing subchorionic hemorrhage and iup that day  Pt with persistent pain and vaginal bleeding  Vss, afebrile, lungs cta, rrr, abdomen soft mild tenderness, lower quadrants    Hcg, u/s, cbc

## 2022-12-28 LAB
BACTERIA UR CULT: ABNORMAL
BACTERIA UR CULT: ABNORMAL

## 2025-04-21 ENCOUNTER — TELEPHONE (OUTPATIENT)
Dept: OTHER | Facility: OTHER | Age: 45
End: 2025-04-21

## 2025-04-21 NOTE — TELEPHONE ENCOUNTER
Outreach Reason: [place] Screening Event:    Outreach made to patient regarding Mammogram screening event May 3rd at [facility] Radiology department from 8-2pm. Information provided and patient encouraged to call [facility] office to determine eligibility for free Breast Cancer Screening.    Contact information: Phone:[phone] to qualify them for our [employer] program for a free Mammogram.      Hope to see you at the event.